# Patient Record
Sex: FEMALE | Race: WHITE | NOT HISPANIC OR LATINO | Employment: FULL TIME | URBAN - METROPOLITAN AREA
[De-identification: names, ages, dates, MRNs, and addresses within clinical notes are randomized per-mention and may not be internally consistent; named-entity substitution may affect disease eponyms.]

---

## 2024-02-27 ENCOUNTER — APPOINTMENT (OUTPATIENT)
Dept: LAB | Facility: CLINIC | Age: 44
End: 2024-02-27

## 2024-02-27 ENCOUNTER — APPOINTMENT (OUTPATIENT)
Dept: URGENT CARE | Facility: CLINIC | Age: 44
End: 2024-02-27

## 2024-02-27 DIAGNOSIS — Z02.1 PRE-EMPLOYMENT HEALTH SCREENING EXAMINATION: ICD-10-CM

## 2024-02-27 DIAGNOSIS — Z02.1 PRE-EMPLOYMENT HEALTH SCREENING EXAMINATION: Primary | ICD-10-CM

## 2024-02-27 LAB — RUBV IGG SERPL IA-ACNC: 14.1 IU/ML

## 2024-02-27 PROCEDURE — 86787 VARICELLA-ZOSTER ANTIBODY: CPT

## 2024-02-27 PROCEDURE — 36415 COLL VENOUS BLD VENIPUNCTURE: CPT

## 2024-02-27 PROCEDURE — 86765 RUBEOLA ANTIBODY: CPT

## 2024-02-27 PROCEDURE — 86735 MUMPS ANTIBODY: CPT

## 2024-02-27 PROCEDURE — 86480 TB TEST CELL IMMUN MEASURE: CPT

## 2024-02-27 PROCEDURE — 86762 RUBELLA ANTIBODY: CPT

## 2024-02-28 LAB
GAMMA INTERFERON BACKGROUND BLD IA-ACNC: 0.06 IU/ML
M TB IFN-G BLD-IMP: NEGATIVE
M TB IFN-G CD4+ BCKGRND COR BLD-ACNC: 0.03 IU/ML
M TB IFN-G CD4+ BCKGRND COR BLD-ACNC: 0.09 IU/ML
MEV IGG SER QL IA: NORMAL
MITOGEN IGNF BCKGRD COR BLD-ACNC: 9.94 IU/ML
MUV IGG SER QL IA: NORMAL
VZV IGG SER QL IA: NORMAL

## 2024-06-23 ENCOUNTER — HOSPITAL ENCOUNTER (OUTPATIENT)
Facility: HOSPITAL | Age: 44
Setting detail: OBSERVATION
Discharge: HOME | End: 2024-06-24
Attending: EMERGENCY MEDICINE | Admitting: INTERNAL MEDICINE
Payer: COMMERCIAL

## 2024-06-23 ENCOUNTER — APPOINTMENT (EMERGENCY)
Dept: RADIOLOGY | Facility: HOSPITAL | Age: 44
End: 2024-06-23
Payer: COMMERCIAL

## 2024-06-23 DIAGNOSIS — R94.31 ABNORMAL EKG: ICD-10-CM

## 2024-06-23 DIAGNOSIS — R00.2 PALPITATIONS: Primary | ICD-10-CM

## 2024-06-23 PROBLEM — G43.909 MIGRAINE, UNSPECIFIED, NOT INTRACTABLE, WITHOUT STATUS MIGRAINOSUS: Status: ACTIVE | Noted: 2024-06-23

## 2024-06-23 PROBLEM — I10 PRIMARY HYPERTENSION: Status: ACTIVE | Noted: 2024-06-23

## 2024-06-23 PROBLEM — E78.49 FAMILIAL HYPERLIPIDEMIA: Status: ACTIVE | Noted: 2024-06-23

## 2024-06-23 PROBLEM — F41.9 ANXIETY DISORDER, UNSPECIFIED: Status: ACTIVE | Noted: 2024-06-23

## 2024-06-23 PROBLEM — E66.01 MORBID OBESITY (HCC): Status: ACTIVE | Noted: 2024-06-23

## 2024-06-23 PROBLEM — I49.3 VENTRICULAR PREMATURE DEPOLARIZATION: Status: ACTIVE | Noted: 2024-06-23

## 2024-06-23 LAB
2HR DELTA HS TROPONIN: -1 NG/L
4HR DELTA HS TROPONIN: -7 NG/L
ALBUMIN SERPL BCG-MCNC: 4.2 G/DL (ref 3.5–5)
ALP SERPL-CCNC: 63 U/L (ref 34–104)
ALT SERPL W P-5'-P-CCNC: 15 U/L (ref 7–52)
ANION GAP SERPL CALCULATED.3IONS-SCNC: 10 MMOL/L (ref 4–13)
AST SERPL W P-5'-P-CCNC: 21 U/L (ref 13–39)
BASOPHILS # BLD AUTO: 0.09 THOUSANDS/ÂΜL (ref 0–0.1)
BASOPHILS NFR BLD AUTO: 1 % (ref 0–1)
BILIRUB SERPL-MCNC: 0.37 MG/DL (ref 0.2–1)
BUN SERPL-MCNC: 11 MG/DL (ref 5–25)
CALCIUM SERPL-MCNC: 9.4 MG/DL (ref 8.4–10.2)
CARDIAC TROPONIN I PNL SERPL HS: 17 NG/L
CARDIAC TROPONIN I PNL SERPL HS: 23 NG/L
CARDIAC TROPONIN I PNL SERPL HS: 24 NG/L
CHLORIDE SERPL-SCNC: 105 MMOL/L (ref 96–108)
CO2 SERPL-SCNC: 22 MMOL/L (ref 21–32)
CREAT SERPL-MCNC: 0.62 MG/DL (ref 0.6–1.3)
EOSINOPHIL # BLD AUTO: 0.08 THOUSAND/ÂΜL (ref 0–0.61)
EOSINOPHIL NFR BLD AUTO: 1 % (ref 0–6)
ERYTHROCYTE [DISTWIDTH] IN BLOOD BY AUTOMATED COUNT: 13.3 % (ref 11.6–15.1)
EXT PREGNANCY TEST URINE: NEGATIVE
EXT. CONTROL: NORMAL
GFR SERPL CREATININE-BSD FRML MDRD: 110 ML/MIN/1.73SQ M
GLUCOSE SERPL-MCNC: 103 MG/DL (ref 65–140)
HCT VFR BLD AUTO: 42.8 % (ref 34.8–46.1)
HGB BLD-MCNC: 13.8 G/DL (ref 11.5–15.4)
IMM GRANULOCYTES # BLD AUTO: 0.06 THOUSAND/UL (ref 0–0.2)
IMM GRANULOCYTES NFR BLD AUTO: 1 % (ref 0–2)
LYMPHOCYTES # BLD AUTO: 2.17 THOUSANDS/ÂΜL (ref 0.6–4.47)
LYMPHOCYTES NFR BLD AUTO: 16 % (ref 14–44)
MAGNESIUM SERPL-MCNC: 2.1 MG/DL (ref 1.9–2.7)
MCH RBC QN AUTO: 27.8 PG (ref 26.8–34.3)
MCHC RBC AUTO-ENTMCNC: 32.2 G/DL (ref 31.4–37.4)
MCV RBC AUTO: 86 FL (ref 82–98)
MONOCYTES # BLD AUTO: 0.49 THOUSAND/ÂΜL (ref 0.17–1.22)
MONOCYTES NFR BLD AUTO: 4 % (ref 4–12)
NEUTROPHILS # BLD AUTO: 10.38 THOUSANDS/ÂΜL (ref 1.85–7.62)
NEUTS SEG NFR BLD AUTO: 77 % (ref 43–75)
NRBC BLD AUTO-RTO: 0 /100 WBCS
PLATELET # BLD AUTO: 306 THOUSANDS/UL (ref 149–390)
PMV BLD AUTO: 10.2 FL (ref 8.9–12.7)
POTASSIUM SERPL-SCNC: 3.9 MMOL/L (ref 3.5–5.3)
PROT SERPL-MCNC: 7.7 G/DL (ref 6.4–8.4)
RBC # BLD AUTO: 4.97 MILLION/UL (ref 3.81–5.12)
SODIUM SERPL-SCNC: 137 MMOL/L (ref 135–147)
TSH SERPL DL<=0.05 MIU/L-ACNC: 2.02 UIU/ML (ref 0.45–4.5)
WBC # BLD AUTO: 13.27 THOUSAND/UL (ref 4.31–10.16)

## 2024-06-23 PROCEDURE — 81025 URINE PREGNANCY TEST: CPT | Performed by: EMERGENCY MEDICINE

## 2024-06-23 PROCEDURE — 71046 X-RAY EXAM CHEST 2 VIEWS: CPT

## 2024-06-23 PROCEDURE — 96360 HYDRATION IV INFUSION INIT: CPT

## 2024-06-23 PROCEDURE — 99223 1ST HOSP IP/OBS HIGH 75: CPT | Performed by: PHYSICIAN ASSISTANT

## 2024-06-23 PROCEDURE — 36415 COLL VENOUS BLD VENIPUNCTURE: CPT | Performed by: EMERGENCY MEDICINE

## 2024-06-23 PROCEDURE — 99285 EMERGENCY DEPT VISIT HI MDM: CPT | Performed by: EMERGENCY MEDICINE

## 2024-06-23 PROCEDURE — 99285 EMERGENCY DEPT VISIT HI MDM: CPT

## 2024-06-23 PROCEDURE — 80053 COMPREHEN METABOLIC PANEL: CPT | Performed by: EMERGENCY MEDICINE

## 2024-06-23 PROCEDURE — 84443 ASSAY THYROID STIM HORMONE: CPT | Performed by: PHYSICIAN ASSISTANT

## 2024-06-23 PROCEDURE — 85025 COMPLETE CBC W/AUTO DIFF WBC: CPT | Performed by: EMERGENCY MEDICINE

## 2024-06-23 PROCEDURE — 83735 ASSAY OF MAGNESIUM: CPT | Performed by: PHYSICIAN ASSISTANT

## 2024-06-23 PROCEDURE — 84484 ASSAY OF TROPONIN QUANT: CPT | Performed by: EMERGENCY MEDICINE

## 2024-06-23 PROCEDURE — 84484 ASSAY OF TROPONIN QUANT: CPT | Performed by: PHYSICIAN ASSISTANT

## 2024-06-23 PROCEDURE — 93005 ELECTROCARDIOGRAM TRACING: CPT

## 2024-06-23 RX ORDER — CETIRIZINE HYDROCHLORIDE 5 MG/1
10 TABLET ORAL DAILY
COMMUNITY

## 2024-06-23 RX ORDER — ASPIRIN 81 MG/1
81 TABLET, CHEWABLE ORAL DAILY
COMMUNITY

## 2024-06-23 RX ORDER — HYDROCHLOROTHIAZIDE 12.5 MG/1
12.5 TABLET ORAL DAILY
Status: DISCONTINUED | OUTPATIENT
Start: 2024-06-23 | End: 2024-06-24 | Stop reason: HOSPADM

## 2024-06-23 RX ORDER — ROSUVASTATIN CALCIUM 40 MG/1
40 TABLET, COATED ORAL
COMMUNITY

## 2024-06-23 RX ORDER — MECLIZINE HCL 12.5 MG/1
25 TABLET ORAL 3 TIMES DAILY PRN
Status: DISCONTINUED | OUTPATIENT
Start: 2024-06-23 | End: 2024-06-24 | Stop reason: HOSPADM

## 2024-06-23 RX ORDER — ALPRAZOLAM 0.5 MG/1
0.5 TABLET ORAL 2 TIMES DAILY PRN
Status: DISCONTINUED | OUTPATIENT
Start: 2024-06-23 | End: 2024-06-24 | Stop reason: HOSPADM

## 2024-06-23 RX ORDER — MECLIZINE HYDROCHLORIDE 25 MG/1
TABLET ORAL 3 TIMES DAILY PRN
COMMUNITY

## 2024-06-23 RX ORDER — ACETAMINOPHEN 325 MG/1
650 TABLET ORAL EVERY 6 HOURS PRN
Status: DISCONTINUED | OUTPATIENT
Start: 2024-06-23 | End: 2024-06-24 | Stop reason: HOSPADM

## 2024-06-23 RX ORDER — ALPRAZOLAM 0.5 MG/1
0.5 TABLET ORAL 2 TIMES DAILY PRN
COMMUNITY

## 2024-06-23 RX ORDER — HYDROCHLOROTHIAZIDE 12.5 MG/1
12.5 TABLET ORAL DAILY
COMMUNITY

## 2024-06-23 RX ORDER — ASPIRIN 81 MG/1
81 TABLET, CHEWABLE ORAL DAILY
Status: DISCONTINUED | OUTPATIENT
Start: 2024-06-23 | End: 2024-06-24 | Stop reason: HOSPADM

## 2024-06-23 RX ORDER — ZOLMITRIPTAN 2.5 MG/1
2.5 TABLET, FILM COATED ORAL EVERY 2 HOUR PRN
COMMUNITY

## 2024-06-23 RX ORDER — ATORVASTATIN CALCIUM 40 MG/1
80 TABLET, FILM COATED ORAL DAILY
Status: DISCONTINUED | OUTPATIENT
Start: 2024-06-23 | End: 2024-06-24 | Stop reason: HOSPADM

## 2024-06-23 RX ORDER — NAPROXEN 250 MG/1
250 TABLET ORAL 2 TIMES DAILY PRN
COMMUNITY

## 2024-06-23 RX ADMIN — HYDROCHLOROTHIAZIDE 12.5 MG: 12.5 TABLET ORAL at 21:07

## 2024-06-23 RX ADMIN — SODIUM CHLORIDE 1000 ML: 0.9 INJECTION, SOLUTION INTRAVENOUS at 15:42

## 2024-06-23 RX ADMIN — ASPIRIN 81 MG CHEWABLE TABLET 81 MG: 81 TABLET CHEWABLE at 21:07

## 2024-06-23 RX ADMIN — ALPRAZOLAM 0.5 MG: 0.5 TABLET ORAL at 21:07

## 2024-06-23 NOTE — LETTER
[facility] MED SURG UNIT  [address]  [place] [address]  Dept: [phone]    June 24, 2024     Patient: Francoise Sawyer   YOB: 1980   Date of Visit: 6/23/2024       To Whom it May Concern:    Francoise Sawyer is under my professional care. She was seen in the hospital from 6/23/2024 to 06/24/24. She may return to work on 6/26/2024 without limitations. Please allow patient flexibility for follow up appointments that are scheduled in the upcoming weeks.     If you have any questions or concerns, please don't hesitate to call.         Sincerely,          Sandra Sheikh PA-C

## 2024-06-23 NOTE — ED PROVIDER NOTES
"History  Chief Complaint   Patient presents with    Chest Pain     Pt to ED c/o chest heaviness that started about 2 hours ago. Pt states at that time heart was racing and was dizzy like she was going to pass out that lasted about 2 mins. Pt states her heart doesn't feel like its racing any more but doesn't feel \"right\"      43-year-old female presents for evaluation of sudden onset palpitations that started approximately 2 hours prior to arrival.  Patient reports she was wiping a table down in her kitchen when all of a sudden felt her heart racing and she became very lightheaded.  Symptoms lasted approximately 2 minutes then resolved spontaneously.  She reports currently still feeling mildly lightheaded.  History of vertigo but states this does not feel similar.        Prior to Admission Medications   Prescriptions Last Dose Informant Patient Reported? Taking?   ALPRAZolam (XANAX) 0.5 mg tablet   Yes Yes   Sig: Take 0.5 mg by mouth 2 (two) times a day as needed for anxiety   ZOLMitriptan (ZOMIG) 2.5 MG tablet   Yes Yes   Sig: Take 2.5 mg by mouth every 2 (two) hours as needed for migraine   aspirin 81 mg chewable tablet   Yes Yes   Sig: Chew 81 mg daily   hydroCHLOROthiazide 12.5 mg tablet   Yes Yes   Sig: Take 12.5 mg by mouth daily   meclizine (ANTIVERT) 25 mg tablet   Yes Yes   Sig: Take by mouth 3 (three) times a day as needed for dizziness   rosuvastatin (CRESTOR) 40 MG tablet   Yes Yes   Sig: Take 40 mg by mouth daily at bedtime      Facility-Administered Medications: None       Past Medical History:   Diagnosis Date    High cholesterol     Hypertension     Migraines     Vertigo        History reviewed. No pertinent surgical history.    History reviewed. No pertinent family history.  I have reviewed and agree with the history as documented.    E-Cigarette/Vaping     E-Cigarette/Vaping Substances     Social History     Tobacco Use    Smoking status: Never     Passive exposure: Never    Smokeless tobacco: Never "   Substance Use Topics    Alcohol use: Not Currently    Drug use: Never       Review of Systems   Cardiovascular:  Positive for palpitations.       Physical Exam  Physical Exam  Vitals and nursing note reviewed.   Constitutional:       Appearance: She is well-developed.   HENT:      Head: Normocephalic and atraumatic.      Right Ear: External ear normal.      Left Ear: External ear normal.      Nose: Nose normal.   Eyes:      General: No scleral icterus.  Cardiovascular:      Rate and Rhythm: Normal rate.   Pulmonary:      Effort: Pulmonary effort is normal. No respiratory distress.   Abdominal:      General: There is no distension.   Musculoskeletal:         General: No deformity. Normal range of motion.      Cervical back: Normal range of motion.   Skin:     Findings: No rash.   Neurological:      General: No focal deficit present.      Mental Status: She is alert and oriented to person, place, and time.   Psychiatric:         Mood and Affect: Mood normal.         Vital Signs  ED Triage Vitals [06/23/24 1505]   Temperature Pulse Respirations Blood Pressure SpO2   98.6 °F (37 °C) 95 20 (!) 179/135 99 %      Temp Source Heart Rate Source Patient Position - Orthostatic VS BP Location FiO2 (%)   Temporal Monitor Sitting Right arm --      Pain Score       4           Vitals:    06/23/24 1530 06/23/24 1630 06/23/24 1700 06/23/24 1739   BP: 140/85 112/62 147/80 (!) 157/102   Pulse: 84 72 98 80   Patient Position - Orthostatic VS: Lying Lying Lying          Visual Acuity      ED Medications  Medications   ALPRAZolam (XANAX) tablet 0.5 mg (has no administration in time range)   aspirin chewable tablet 81 mg (has no administration in time range)   hydroCHLOROthiazide tablet 12.5 mg (has no administration in time range)   atorvastatin (LIPITOR) tablet 80 mg (has no administration in time range)   meclizine (ANTIVERT) tablet 25 mg (has no administration in time range)   acetaminophen (TYLENOL) tablet 650 mg (has no  administration in time range)   sodium chloride 0.9 % bolus 1,000 mL (0 mL Intravenous Stopped 6/23/24 1652)       Diagnostic Studies  Results Reviewed       Procedure Component Value Units Date/Time    TSH, 3rd generation with Free T4 reflex [[id number]]  (Normal) Collected: 06/23/24 1538    Lab Status: Final result Specimen: Blood from Arm, Left Updated: 06/23/24 1753     TSH 3RD GENERATON 2.025 uIU/mL     Magnesium [[id number]]  (Normal) Collected: 06/23/24 1538    Lab Status: Final result Specimen: Blood from Arm, Left Updated: 06/23/24 1747     Magnesium 2.1 mg/dL     HS Troponin I 2hr [[id number]]     Lab Status: No result Specimen: Blood     HS Troponin I 4hr [[id number]]     Lab Status: No result Specimen: Blood     HS Troponin 0hr (reflex protocol) [[id number]]  (Normal) Collected: 06/23/24 1538    Lab Status: Final result Specimen: Blood from Arm, Left Updated: 06/23/24 1610     hs TnI 0hr 24 ng/L     Comprehensive metabolic panel [[id number]] Collected: 06/23/24 1538    Lab Status: Final result Specimen: Blood from Arm, Left Updated: 06/23/24 1610     Sodium 137 mmol/L      Potassium 3.9 mmol/L      Chloride 105 mmol/L      CO2 22 mmol/L      ANION GAP 10 mmol/L      BUN 11 mg/dL      Creatinine 0.62 mg/dL      Glucose 103 mg/dL      Calcium 9.4 mg/dL      AST 21 U/L      ALT 15 U/L      Alkaline Phosphatase 63 U/L      Total Protein 7.7 g/dL      Albumin 4.2 g/dL      Total Bilirubin 0.37 mg/dL      eGFR 110 ml/min/1.73sq m     Narrative:      National Kid[employer] Disease Foundation guidelines for Chronic Kidney Disease (CKD):     Stage 1 with normal or high GFR (GFR > 90 mL/min/1.73 square meters)    Stage 2 Mild CKD (GFR = 60-89 mL/min/1.73 square meters)    Stage 3A Moderate CKD (GFR = 45-59 mL/min/1.73 square meters)    Stage 3B Moderate CKD (GFR = 30-44 mL/min/1.73 square meters)    Stage 4 Severe CKD (GFR = 15-29 mL/min/1.73 square meters)    Stage 5 End Stage CKD (GFR <15 mL/min/1.73 square meters)  Note:  GFR calculation is accurate only with a steady state creatinine    POCT pregnancy, urine [[id number]]  (Normal) Resulted: 06/23/24 1547    Lab Status: Final result Updated: 06/23/24 1547     EXT Preg Test, Ur Negative     Control Valid    CBC and differential [[id number]]  (Abnormal) Collected: 06/23/24 1538    Lab Status: Final result Specimen: Blood from Arm, Left Updated: 06/23/24 1547     WBC 13.27 Thousand/uL      RBC 4.97 Million/uL      Hemoglobin 13.8 g/dL      Hematocrit 42.8 %      MCV 86 fL      MCH 27.8 pg      MCHC 32.2 g/dL      RDW 13.3 %      MPV 10.2 fL      Platelets 306 Thousands/uL      nRBC 0 /100 WBCs      Segmented % 77 %      Immature Grans % 1 %      Lymphocytes % 16 %      Monocytes % 4 %      Eosinophils Relative 1 %      Basophils Relative 1 %      Absolute Neutrophils 10.38 Thousands/µL      Absolute Immature Grans 0.06 Thousand/uL      Absolute Lymphocytes 2.17 Thousands/µL      Absolute Monocytes 0.49 Thousand/µL      Eosinophils Absolute 0.08 Thousand/µL      Basophils Absolute 0.09 Thousands/µL                    XR chest 2 views   Final Result by Ronda Eddy MD (06/23 1800)      No acute cardiopulmonary disease.               Workstation performed: [id number]                    Procedures  Procedures         ED Course                               SBIRT 20yo+      Flowsheet Row Most Recent Value   Initial Alcohol Screen: US AUDIT-C     1. How often do you have a drink containing alcohol? 0 Filed at: 06/23/2024 1507   2. How many drinks containing alcohol do you have on a typical day you are drinking?  0 Filed at: 06/23/2024 1507   3a. Male UNDER 65: How often do you have five or more drinks on one occasion? 0 Filed at: 06/23/2024 1507   3b. FEMALE Any Age, or MALE 65+: How often do you have 4 or more drinks on one occassion? 0 Filed at: 06/23/2024 1507   Audit-C Score 0 Filed at: 06/23/2024 1507   BEVERLEY: How many times in the past year have you...    Used an illegal drug or  used a prescription medication for non-medical reasons? Never Filed at: 06/23/2024 1507                      Medical Decision Making  43-year-old female presenting with palpitations and lightheadedness.  Obtain cardiopulmonary evaluations with labs, EKG.  EKG reveals T wave inversions.  Troponin slightly elevated.  Will admit for observation telemetry.    Amount and/or Complexity of Data Reviewed  Labs: ordered.    Risk  Decision regarding hospitalization.             Disposition  Final diagnoses:   Palpitations   Abnormal EKG     Time reflects when diagnosis was documented in both MDM as applicable and the Disposition within this note       Time User Action Codes Description Comment    6/23/2024  4:49 PM Sanya Landeros [R00.2] Palpitations     6/23/2024  4:49 PM Sanya Landeros [R94.31] Abnormal EKG           ED Disposition       ED Disposition   Admit    Condition   Stable    Date/Time   Sun Jun 23, 2024 8130    Comment   Case was discussed with JANKI and the patient's admission status was agreed to be Admission Status: observation status to the service of Dr. Perales .               Follow-up Information       Follow up With Specialties Details Why Contact Info    Jose Rosenberg MD Cardiology Schedule an appointment as soon as possible for a visit in 1 week(s)  [address] [place] [address]  [phone]              Current Discharge Medication List        CONTINUE these medications which have NOT CHANGED    Details   ALPRAZolam (XANAX) 0.5 mg tablet Take 0.5 mg by mouth 2 (two) times a day as needed for anxiety      aspirin 81 mg chewable tablet Chew 81 mg daily      hydroCHLOROthiazide 12.5 mg tablet Take 12.5 mg by mouth daily      meclizine (ANTIVERT) 25 mg tablet Take by mouth 3 (three) times a day as needed for dizziness      rosuvastatin (CRESTOR) 40 MG tablet Take 40 mg by mouth daily at bedtime      ZOLMitriptan (ZOMIG) 2.5 MG tablet Take 2.5 mg by mouth every 2 (two) hours as  needed for migraine             No discharge procedures on file.    PDMP Review       None            ED Provider  Electronically Signed by             Sanya Landeros DO  06/23/24 9697

## 2024-06-23 NOTE — H&P
[facility]  H&P  Name: Francoise Sawyer 43 y.o. female I MRN: [id number]  Unit/Bed#: -01 I Date of Admission: 6/23/2024   Date of Service: 6/23/2024 I Hospital Day: 0      Assessment & Plan   * Palpitations  Assessment & Plan  Felt approx 2 mins of palpitations, heart racing, lightheadedness at home, terminated prior to ER  Possible pSVT  Trop 24, trend   EKG non-ischemic  Check TSH, Mag  Tele overnight  Cardiology consult  Admit for obs  Likely needs ambulatory monitor after discharge    Familial hyperlipidemia  Assessment & Plan  Cont home statin    Primary hypertension  Assessment & Plan  BP 140s  Cont HCTZ  Monitor BP    Migraine, unspecified, not intractable, without status migrainosus  Assessment & Plan  Zomig PRN    Anxiety disorder, unspecified  Assessment & Plan  Cont home Xanax         VTE Pharmacologic Prophylaxis: VTE Score: 2 Low Risk (Score 0-2) - Encourage Ambulation.  Code Status: full code    Anticipated Length of Stay: Patient will be admitted on an observation basis with an anticipated length of stay of less than 2 midnights secondary to palpitations and likely cardiac arrhythmia requiring telemetry monitoring and cardiology consult.    Total Time for Visit, including Counseling / Coordination of Care: 75 Minutes. Greater than 50% of this total time spent on direct patient counseling and coordination of care.    Chief Complaint: palpitations    History of Present Illness:  Francoise Sawyer is a 43 y.o. female presenting after 2 minutes of palpitations, lightheadedness and feeling her heart race.  Patient reports she was in her normal state of health, suddenly felt onset of palpitations and lightheadedness.  Did not pass out but felt very ill.  Has had history of PVCs in the past with normal workup.  Is not on beta-blocker or rate altering medications.  Came into the ER for evaluation without symptoms at this time.  Feels anxious at this point but her  palpitations are gone.    Review of Systems:  Review of Systems   Constitutional:  Negative for activity change, appetite change, chills, fatigue and fever.   HENT:  Negative for congestion, rhinorrhea, sinus pressure and sore throat.    Eyes:  Negative for photophobia, pain and visual disturbance.   Respiratory:  Negative for cough, shortness of breath and wheezing.    Cardiovascular:  Positive for palpitations. Negative for chest pain and leg swelling.   Gastrointestinal:  Negative for abdominal distention, abdominal pain, constipation, diarrhea, nausea and vomiting.   Endocrine: Negative for cold intolerance, heat intolerance, polydipsia and polyuria.   Genitourinary:  Negative for difficulty urinating, dysuria, flank pain, frequency and hematuria.   Musculoskeletal:  Negative for arthralgias, back pain and joint swelling.   Skin:  Negative for color change, pallor and rash.   Allergic/Immunologic: Negative.    Neurological:  Positive for light-headedness. Negative for dizziness, syncope, weakness and headaches.   Hematological: Negative.    Psychiatric/Behavioral: Negative.         Past Medical and Surgical History:   Past Medical History:   Diagnosis Date    High cholesterol     Hypertension     Migraines     Vertigo        History reviewed. No pertinent surgical history.    Meds/Allergies:  Prior to Admission medications    Medication Sig Start Date End Date Taking? Authorizing Provider   ALPRAZolam (XANAX) 0.5 mg tablet Take 0.5 mg by mouth 2 (two) times a day as needed for anxiety   Yes Historical Provider, MD   aspirin 81 mg chewable tablet Chew 81 mg daily   Yes Historical Provider, MD   hydroCHLOROthiazide 12.5 mg tablet Take 12.5 mg by mouth daily   Yes Historical Provider, MD   meclizine (ANTIVERT) 25 mg tablet Take by mouth 3 (three) times a day as needed for dizziness   Yes Historical Provider, MD   rosuvastatin (CRESTOR) 40 MG tablet Take 40 mg by mouth daily at bedtime   Yes Historical Provider, MD    ZOLMitriptan (ZOMIG) 2.5 MG tablet Take 2.5 mg by mouth every 2 (two) hours as needed for migraine   Yes Historical Provider, MD       All medications reviewed.    Allergies: No Known Allergies    Social History:  Marital Status: /Civil Union     Substance Use History:   Social History     Substance and Sexual Activity   Alcohol Use Not Currently     Social History     Tobacco Use   Smoking Status Never    Passive exposure: Never   Smokeless Tobacco Never     Social History     Substance and Sexual Activity   Drug Use Never       Family History:  Non-contributory    Physical Exam:     Vitals:   Blood Pressure: (!) 157/102 (06/23/24 1739)  Pulse: 80 (06/23/24 1739)  Temperature: 98.3 °F (36.8 °C) (06/23/24 1739)  Temp Source: Temporal (06/23/24 1505)  Respirations: 18 (06/23/24 1700)  Weight - Scale: (!) 166 kg (365 lb) (06/23/24 1505)  SpO2: 96 % (06/23/24 1739)    Physical Exam  Vitals and nursing note reviewed.   Constitutional:       General: She is not in acute distress.     Appearance: Normal appearance.   HENT:      Head: Normocephalic and atraumatic.      Mouth/Throat:      Mouth: Mucous membranes are moist.   Eyes:      General: No scleral icterus.     Extraocular Movements: Extraocular movements intact.      Pupils: Pupils are equal, round, and reactive to light.   Cardiovascular:      Rate and Rhythm: Normal rate and regular rhythm.      Heart sounds: Normal heart sounds. No murmur heard.     No friction rub.   Pulmonary:      Effort: Pulmonary effort is normal.      Breath sounds: Normal breath sounds. No wheezing or rhonchi.   Abdominal:      General: Bowel sounds are normal. There is no distension.      Palpations: Abdomen is soft.      Tenderness: There is no abdominal tenderness. There is no guarding.   Musculoskeletal:         General: No swelling.      Cervical back: Neck supple.      Right lower leg: No edema.      Left lower leg: No edema.   Skin:     General: Skin is warm and dry.       Capillary Refill: Capillary refill takes less than 2 seconds.      Coloration: Skin is not jaundiced or pale.   Neurological:      General: No focal deficit present.      Mental Status: She is alert and oriented to person, place, and time. Mental status is at baseline.          Additional Data:     Lab Results:  Results from last 7 days   Lab Units 06/23/24  1538   WBC Thousand/uL 13.27*   HEMOGLOBIN g/dL 13.8   HEMATOCRIT % 42.8   PLATELETS Thousands/uL 306   SEGS PCT % 77*   LYMPHO PCT % 16   MONO PCT % 4   EOS PCT % 1     Results from last 7 days   Lab Units 06/23/24  1538   SODIUM mmol/L 137   POTASSIUM mmol/L 3.9   CHLORIDE mmol/L 105   CO2 mmol/L 22   BUN mg/dL 11   CREATININE mg/dL 0.62   ANION GAP mmol/L 10   CALCIUM mg/dL 9.4   ALBUMIN g/dL 4.2   TOTAL BILIRUBIN mg/dL 0.37   ALK PHOS U/L 63   ALT U/L 15   AST U/L 21   GLUCOSE RANDOM mg/dL 103                       Imaging: All pertinent imaging reviewed.  XR chest 2 views    (Results Pending)       EKG and Other Studies Reviewed on Admission:   Prior pertinent studies and records reviewed in [facility]/Care Everywhere.    ** Please Note: This note has been constructed using a voice recognition system. **

## 2024-06-23 NOTE — ASSESSMENT & PLAN NOTE
Felt approx 2 mins of palpitations, heart racing, lightheadedness at home, terminated prior to ER  Possible pSVT  Trop 24, trend   EKG non-ischemic  Check TSH, Mag  Tele overnight  Cardiology consult  Admit for obs  Likely needs ambulatory monitor after discharge

## 2024-06-24 ENCOUNTER — APPOINTMENT (OUTPATIENT)
Dept: NON INVASIVE DIAGNOSTICS | Facility: HOSPITAL | Age: 44
End: 2024-06-24
Payer: COMMERCIAL

## 2024-06-24 VITALS
DIASTOLIC BLOOD PRESSURE: 92 MMHG | TEMPERATURE: 98 F | HEART RATE: 79 BPM | HEIGHT: 67 IN | BODY MASS INDEX: 45.99 KG/M2 | OXYGEN SATURATION: 96 % | RESPIRATION RATE: 18 BRPM | SYSTOLIC BLOOD PRESSURE: 136 MMHG | WEIGHT: 293 LBS

## 2024-06-24 LAB
AORTIC ROOT: 3.4 CM
APICAL FOUR CHAMBER EJECTION FRACTION: 69 %
ASCENDING AORTA: 3.1 CM
BSA FOR ECHO PROCEDURE: 2.6 M2
E WAVE DECELERATION TIME: 165 MS
E/A RATIO: 0.95
FRACTIONAL SHORTENING: 30 (ref 28–44)
INTERVENTRICULAR SEPTUM IN DIASTOLE (PARASTERNAL SHORT AXIS VIEW): 1.3 CM
INTERVENTRICULAR SEPTUM: 1.3 CM (ref 0.6–1.1)
LAAS-AP2: 19.7 CM2
LAAS-AP4: 29.8 CM2
LEFT ATRIUM SIZE: 4.8 CM
LEFT ATRIUM VOLUME (MOD BIPLANE): 90 ML
LEFT INTERNAL DIMENSION IN SYSTOLE: 3.3 CM (ref 2.1–4)
LEFT VENTRICLE DIASTOLIC VOLUME (MOD BIPLANE): 221 ML
LEFT VENTRICLE DIASTOLIC VOLUME INDEX (MOD BIPLANE): 85 ML/M2
LEFT VENTRICLE SYSTOLIC VOLUME (MOD BIPLANE): 71 ML
LEFT VENTRICLE SYSTOLIC VOLUME INDEX (MOD BIPLANE): 27.3 ML/M2
LEFT VENTRICULAR INTERNAL DIMENSION IN DIASTOLE: 4.7 CM (ref 3.5–6)
LEFT VENTRICULAR POSTERIOR WALL IN END DIASTOLE: 1.3 CM
LEFT VENTRICULAR STROKE VOLUME: 58 ML
LV EF: 68 %
LVSV (TEICH): 58 ML
MV E'TISSUE VEL-LAT: 15 CM/S
MV E'TISSUE VEL-SEP: 14 CM/S
MV PEAK A VEL: 0.6 M/S
MV PEAK E VEL: 57 CM/S
MV STENOSIS PRESSURE HALF TIME: 48 MS
MV VALVE AREA P 1/2 METHOD: 4.58
RA PRESSURE ESTIMATED: 3 MMHG
RIGHT ATRIUM AREA SYSTOLE A4C: 27.1 CM2
RIGHT VENTRICLE ID DIMENSION: 3.5 CM
SL CV LEFT ATRIUM LENGTH A2C: 5.1 CM
SL CV LV EF: 55
SL CV PED ECHO LEFT VENTRICLE DIASTOLIC VOLUME (MOD BIPLANE) 2D: 101 ML
SL CV PED ECHO LEFT VENTRICLE SYSTOLIC VOLUME (MOD BIPLANE) 2D: 43 ML
TRICUSPID ANNULAR PLANE SYSTOLIC EXCURSION: 1.9 CM

## 2024-06-24 PROCEDURE — 93306 TTE W/DOPPLER COMPLETE: CPT

## 2024-06-24 PROCEDURE — 93306 TTE W/DOPPLER COMPLETE: CPT | Performed by: INTERNAL MEDICINE

## 2024-06-24 PROCEDURE — 99239 HOSP IP/OBS DSCHRG MGMT >30: CPT

## 2024-06-24 PROCEDURE — 99244 OFF/OP CNSLTJ NEW/EST MOD 40: CPT | Performed by: INTERNAL MEDICINE

## 2024-06-24 NOTE — DISCHARGE SUMMARY
[facility]  Discharge- Francoise Sawyer 1980, 43 y.o. female MRN: [id number]  Unit/Bed#: -[facility] Encounter: [id number]  Primary Care Provider: Dorina Colin MD   Date and time admitted to hospital: 6/23/2024  3:09 PM    * Palpitations  Assessment & Plan  Felt approx 2 mins of palpitations, heart racing, lightheadedness at home, terminated prior to ER  Possible pSVT  Trop 24 > 17  EKG non-ischemic  TSH WNL  Tele overnight  Cardiology consulted  No further events overnight   Echo unremarkable  Outpatient ZIO patch on DC  Outpatient f/u with cardiology in 1 week   Lopressor 25 mg PRN palpitations on discharge  Cleared from cardiology stand point     Familial hyperlipidemia  Assessment & Plan  Cont home statin    Primary hypertension  Assessment & Plan  BP 140s  Cont HCTZ  Monitor BP    Migraine, unspecified, not intractable, without status migrainosus  Assessment & Plan  Zomig PRN    Anxiety disorder, unspecified  Assessment & Plan  Cont home Xanax        Medical Problems       Resolved Problems  Date Reviewed: 6/24/2024   None       Discharging Physician / Practitioner: Sandra Sheikh PA-C  PCP: Dorina Colin MD  Admission Date:   Admission Orders (From admission, onward)       Ordered        06/23/24 1650  Place in Observation  Once                          Discharge Date: 06/24/24    Consultations During Hospital Stay:  Cardiology     Procedures Performed:   None     Significant Findings / Test Results:   XR chest 2 views   Final Result by Ronda Eddy MD (06/23 1800)      No acute cardiopulmonary disease.               Workstation performed: [id number]           TTE 6/24:     Incidental Findings:   None      Test Results Pending at Discharge (will require follow up):   None      Outpatient Tests Requested:  Outpatient cardiac holter monitoring     Complications:  None     Reason for Admission: rapid HR    Hospital Course:   Francoise Sawyer is a 43  "y.o. female patient with PMH of migraines, anxiety, hypertension who originally presented to the hospital on 6/23/2024 due to acute onset 2-minute run of palpitations with associated lightheadedness and shortness of breath while patient was at home at rest.  Patient had prior history of PVCs in the past with normal workup, however notes this was different from her prior symptoms as she felt her heart was racing for 2 minutes.  Electrolytes were stable, ECG nonischemic, tropes WNL.  Cardiology was consulted.  Patient was monitored on telemetry and underwent echocardiogram which were unremarkable.  Patient was without recurrence of symptoms during her admission.  Cardiology recommended Zio patch on discharge for cardiac monitoring as well as follow-up in the cardiology office within 1 week of discharge.  Patient was cleared from cardiology standpoint without further need for inpatient workup.  Labs and vital stable, patient without further symptoms, cleared for discharge to home.    Please see above list of diagnoses and related plan for additional information.     Condition at Discharge: good    Discharge Day Visit / Exam:   Subjective: Patient denies any chest pain, palpitations, shortness of breath, abdominal complaints, or any further symptoms since admission.  Looking forward to going home.  Vitals: Blood Pressure: 136/92 (06/24/24 1405)  Pulse: 79 (06/24/24 1405)  Temperature: 98 °F (36.7 °C) (06/24/24 1257)  Temp Source: Oral (06/24/24 1257)  Respirations: 18 (06/24/24 1257)  Height: 5' 6.75\" (169.5 cm) (06/24/24 1405)  Weight - Scale: (!) 166 kg (365 lb) (06/24/24 1405)  SpO2: 96 % (06/24/24 1305)  Exam:   Physical Exam  Vitals and nursing note reviewed.   Constitutional:       General: She is not in acute distress.     Appearance: She is well-developed. She is not ill-appearing.   HENT:      Head: Normocephalic and atraumatic.   Eyes:      General:         Right eye: No discharge.         Left eye: No " discharge.      Extraocular Movements: Extraocular movements intact.      Conjunctiva/sclera: Conjunctivae normal.   Cardiovascular:      Rate and Rhythm: Normal rate and regular rhythm.      Heart sounds: No murmur heard.  Pulmonary:      Effort: Pulmonary effort is normal. No respiratory distress.      Breath sounds: Normal breath sounds. No wheezing, rhonchi or rales.   Abdominal:      General: Bowel sounds are normal. There is no distension.      Palpations: Abdomen is soft.      Tenderness: There is no abdominal tenderness.   Musculoskeletal:      Cervical back: Neck supple.      Right lower leg: No edema.      Left lower leg: No edema.   Skin:     General: Skin is warm and dry.      Capillary Refill: Capillary refill takes less than 2 seconds.   Neurological:      General: No focal deficit present.      Mental Status: She is alert and oriented to person, place, and time. Mental status is at baseline.      Cranial Nerves: No cranial nerve deficit.   Psychiatric:         Mood and Affect: Mood normal.         Behavior: Behavior normal.          Discussion with Family: Updated  () at bedside.    Discharge instructions/Information to patient and family:   See after visit summary for information provided to patient and family.      Provisions for Follow-Up Care:  See after visit summary for information related to follow-up care and any pertinent home health orders.      Mobility at time of Discharge:   Basic Mobility Inpatient Raw Score: 24  JH-HLM Goal: 8: Walk 250 feet or more  JH-HLM Achieved: 7: Walk 25 feet or more  HLM Goal achieved. Continue to encourage appropriate mobility.     Disposition:   Home    Planned Readmission: none      Discharge Statement:  I spent 45 minutes discharging the patient. This time was spent on the day of discharge. I had direct contact with the patient on the day of discharge. Greater than 50% of the total time was spent examining patient, answering all patient  questions, arranging and discussing plan of care with patient as well as directly providing post-discharge instructions.  Additional time then spent on discharge activities.    Discharge Medications:  See after visit summary for reconciled discharge medications provided to patient and/or family.      **Please Note: This note may have been constructed using a voice recognition system**

## 2024-06-24 NOTE — ASSESSMENT & PLAN NOTE
Felt approx 2 mins of palpitations, heart racing, lightheadedness at home, terminated prior to ER  Possible pSVT  Trop 24 > 17  EKG non-ischemic  TSH WNL  Tele overnight  Cardiology consulted  No further events overnight   Echo unremarkable  Outpatient ZIO patch on DC  Outpatient f/u with cardiology in 1 week   Lopressor 25 mg PRN palpitations on discharge  Cleared from cardiology stand point

## 2024-06-24 NOTE — CONSULTS
"Consult - Cardiology   Francoise Sawyer 43 y.o. female MRN: [id number]  Unit/Bed#: -01 Encounter: [id number]    Reason For Consult:  Palpitations   Outpatient Cardiologist: None            History of Present Illness:  Francoise Sawyer is a 43 y.o. year old female with PMH palpitations, familial hyperlipidemia, primary HTN, migraine, anxiety disorder, and seasonal allergies who presents to [facility] on 6/23/24 with a chief complaint of palpitations, lightheadedness that began yesterday 6/23/24 morning at home. She was seated at her kitchen table when she began to have palpitations with a \"pounding heartbeat\" and near syncope that lasted approx 2 mins. She denied any other episodes with near syncope in the past. Prior to yesterday, her palpitations/racing heartbeat would last hours to days, but do occur intermittently. She denied any alleviating factors. She mentioned that her palpitations correlate with the first days of her menses most months. She admitted to taking Zyrtec-D on Friday and Saturday prior to her episode Sunday. She denied any other medication changes, dehydration, activity changes recently. She admits to drinking 40-60oz water daily with moderate caffeine intake (2-3 cups coffee daily). Pt did have workup approx 7 years ago for her palpitations without any clear findings. Pt was told that palpitations were \"not concerning\" at that time. She has significant cardiac Fhx - father with fatal MI at 6 and both sets of grandparents with hypercholesterolemia. She currently takes Hctz 12.5mg daily for HTN, Rosuvastatin 40mg daily for HLD, and ASA 81mg for MI/CVA prevention.     ROS:  14 point ROS negative except as outlined above  Remainder review of systems is negative    Diagnostics:   Telemetry with NSR, regular rate. No PVCs seen during hospitalization.   Electrolytes have been stable during hospitalization.   CBC, CMP, troponin levels WNL.     PE:  General: Alert, oriented and " in no acute distress, cooperative  Head: Normocephalic, atraumatic.  Eyes: PERRLA. No icterus. Normal Conjunctiva.   Oropharynx: Moist and normal-appearing mucosa  Neck: Supple, symmetrical, trachea midline, JVD not appreciated.   Heart: RRR, no murmur, rub or gallop, S1 & S2 normal, no JVD noted  Lungs: Normal air entry, lungs clear to auscultation and no rales, rhonchi or wheezing   Abdomen: Flat, normal findings: bowel sounds normal and soft, non-tender  Lower Limbs:  No pitting edema, 2+ peripheral pulses, capillary refill within normal limits  Musculoskeletal: ROM grossly normal     Assessment and Plan:  Palpitations  TTE scheduled for today. Will contact patient with any abnormal results. Can otherwise be discharged from a CV standpoint. She has another  Appt today and would like to be discharged ASAP if possible. 1 week Holter monitor set up outpatient. Pt is agreeable to this plan.   Start Lopressor 25mg 1 tab PO daily PRN for palpitation symptoms/rapid HR.  Pt was educated to reduce any caffeine if possible. Avoid energy drinks, chocolate, coffee etc if having palpitations or she notices her symptom onset is related to intake.   Follow-up with cardio OP  HTN  Initial BP: 179/135 mmHg, Current /101 mmHg.  No further workup needed at this time.   Continue with Hctz 12.5mg 1 tab PO daily.  Follow up with PCP for HTN management.   HLD   No further workup needed at this time.   Continue Rosuvastatin 40mg 1 tab PO daily for familial hyperlipidemia.   Follow up with PCP for HLD management.       Past Medical History:        Past Medical History:   Diagnosis Date    High cholesterol     Hypertension     Migraines     PVC's (premature ventricular contractions)     Vertigo       Past Surgical History:   Procedure Laterality Date    MANDIBLE SURGERY Bilateral         Allergy:        No Known Allergies    Medications:       Prior to Admission medications    Medication Sig Start Date End Date Taking?  Authorizing Provider   ALPRAZolam (XANAX) 0.5 mg tablet Take 0.5 mg by mouth 2 (two) times a day as needed for anxiety   Yes Historical Provider, MD   aspirin 81 mg chewable tablet Chew 81 mg daily   Yes Historical Provider, MD   cetirizine (ZyrTEC) 5 MG tablet Take 5 mg by mouth daily   Yes Historical Provider, MD   glucosamine-chondroitin 500-400 MG tablet Take 1 tablet by mouth daily after breakfast   Yes Historical Provider, MD   hydroCHLOROthiazide 12.5 mg tablet Take 12.5 mg by mouth daily   Yes Historical Provider, MD   meclizine (ANTIVERT) 25 mg tablet Take by mouth 3 (three) times a day as needed for dizziness   Yes Historical Provider, MD   naproxen (NAPROSYN) 250 mg tablet Take 250 mg by mouth 2 (two) times a day as needed for mild pain   Yes Historical Provider, MD   rosuvastatin (CRESTOR) 40 MG tablet Take 40 mg by mouth daily at bedtime   Yes Historical Provider, MD   ZOLMitriptan (ZOMIG) 2.5 MG tablet Take 2.5 mg by mouth every 2 (two) hours as needed for migraine   Yes Historical Provider, MD       Family History:     Family History   Problem Relation Age of Onset    Stroke Mother     Hyperlipidemia Mother     Hypertension Father     Hyperlipidemia Father     Heart disease Father     Cancer Maternal Grandmother     Hyperlipidemia Maternal Grandmother     Hyperlipidemia Paternal Grandmother     Cancer Paternal Grandmother         Social History:       Social History     Socioeconomic History    Marital status: /Civil Union     Spouse name: None    Number of children: None    Years of education: None    Highest education level: None   Occupational History    None   Tobacco Use    Smoking status: Never     Passive exposure: Never    Smokeless tobacco: Never   Substance and Sexual Activity    Alcohol use: Not Currently    Drug use: Not Currently    Sexual activity: Yes     Partners: Male     Birth control/protection: Rhythm   Other Topics Concern    None   Social History Narrative    None      Social Determinants of Health     Financial Resource Strain: Not on file   Food Insecurity: Not on file   Transportation Needs: Not on file   Physical Activity: Not on file   Stress: Not on file   Social Connections: Not on file   Intimate Partner Violence: Not on file   Housing Stability: Not on file     Weights/BMI:    Wt Readings from Last 3 Encounters:   06/23/24 (!) 166 kg (365 lb)   , There is no height or weight on file to calculate BMI.

## 2024-06-24 NOTE — DISCHARGE INSTR - AVS FIRST PAGE
Follow-up providers:  Please follow-up with PCP within 1 week of discharge  Please follow-up with outpatient cardiology within 1 week of discharge    Medications:  Please take Lopressor 25 mg once by mouth as needed for palpitations/rapid heart rate    Outpatient testing  Please call cardiology office in order to set up outpatient Holter monitor    Please return to the emergency room if you experience new fevers or chills, chest pain, difficulty breathing, palpitations or rapid heart rate, dizziness or lightheadedness, syncope, or any additional concerning symptoms.

## 2024-06-27 LAB
ATRIAL RATE: 92 BPM
P AXIS: 27 DEGREES
PR INTERVAL: 164 MS
QRS AXIS: 21 DEGREES
QRSD INTERVAL: 82 MS
QT INTERVAL: 344 MS
QTC INTERVAL: 425 MS
T WAVE AXIS: 5 DEGREES
VENTRICULAR RATE: 92 BPM

## 2024-06-27 PROCEDURE — 93010 ELECTROCARDIOGRAM REPORT: CPT | Performed by: INTERNAL MEDICINE

## 2024-07-01 ENCOUNTER — OFFICE VISIT (OUTPATIENT)
Dept: CARDIOLOGY CLINIC | Facility: CLINIC | Age: 44
End: 2024-07-01
Payer: COMMERCIAL

## 2024-07-01 VITALS
DIASTOLIC BLOOD PRESSURE: 62 MMHG | HEART RATE: 76 BPM | HEIGHT: 66 IN | BODY MASS INDEX: 47.09 KG/M2 | SYSTOLIC BLOOD PRESSURE: 118 MMHG | WEIGHT: 293 LBS

## 2024-07-01 DIAGNOSIS — R00.2 PALPITATIONS: ICD-10-CM

## 2024-07-01 PROCEDURE — 99214 OFFICE O/P EST MOD 30 MIN: CPT | Performed by: PHYSICIAN ASSISTANT

## 2024-07-01 NOTE — PROGRESS NOTES
Cardiology Office Follow Up  Francoise Sawyer  1980  [id number]      ASSESSMENT:  Palpitations --suspect SVT vs PVCs  Hypertension  Hyperlipidemia  BMI 58.75  Hx of migraines  Anxiety disorder    PLAN:  Lily has been asymptomatic since her admission. No episodes of palpitations. Will proceed with 1 week ZIO event monitor to assess for any underlying arrhythmias or ectopy.  She reports concern with possible underlying CAD given mild troponin elevation and family history. She fortunately denies any overt chest pain or discomfort. Will order coronary CTA for definitive assessment of her coronary arteries to be scheduled at her convenience.  BP stable today 118/62; continue HCTZ 12.5 mg daily.  No LDL on file; plan for FLP following next visit -- continue rosuvastatin 40 mg daily.  No myalgias.  RTO in 6 to 8 weeks or sooner if needed.    Interval History/ HPI:   Francoise Sawyer is a 43-year-old female with history of palpitations due to PVCs, familial hyperlipidemia, hypertension, migraine, anxiety disorder, seasonal allergies who presents for hospital follow-up visit.  She presented to [facility] on 6/23/2024 with palpitations, lightheadedness that began the morning of admission. She was seated at her kitchen table at rest began to have palpitations with pounding and lightheadedness/ presyncope that lasted about 2 minutes. Prior to this complained of intermittent palpitations that lasted anywhere from 5 to 10 seconds notices symptoms more so around her menses. She otherwise denied any symptom patterns, exasperating or alleviating factors.    She underwent initial lab work including CBC, CMP, TFTs which were within normal limits. Serial troponins were 24/23/17.  EKG shows sinus rhythm without ischemic changes.  Telemetry unremarkable without acute events or sustained arrhythmias during her admission.  TTE showed EF 55%, normal systolic function and wall motion, normal diastolic function,  RV size normal with normal RV systolic function.  No significant valvular abnormalities noted.  She was asymptomatic at the time of her examination.  She was ordered for p.o. Lopressor 25 mg to take as needed based on symptoms and recommended for cardiology follow-up to be set up for 1 week Zio monitor.  She does take occasional decongestants such as Zyrtec D for seasonal allergies.  But not since her most recent discharge.  Reports of adequate hydration and drinking approximately 40 to 60 ounces of water daily. She drinks about 2 to 3 cups of coffee daily for several years.  Present father with fatal MI at 60 and both sets of grandparents with hypercholesterolemia.  Her blood pressures have been reasonably stable on HCTZ 12.5 mg daily.  Takes Crestor 40 mg daily for hyperlipidemia.      Vitals:  118/62  76  364 LBS    Past Medical History:   Diagnosis Date    High cholesterol     Hypertension     Migraines     PVC's (premature ventricular contractions)     Vertigo      Social History     Socioeconomic History    Marital status: /Civil Union     Spouse name: Not on file    Number of children: Not on file    Years of education: Not on file    Highest education level: Not on file   Occupational History    Not on file   Tobacco Use    Smoking status: Never     Passive exposure: Never    Smokeless tobacco: Never   Substance and Sexual Activity    Alcohol use: Not Currently    Drug use: Not Currently    Sexual activity: Yes     Partners: Male     Birth control/protection: Rhythm   Other Topics Concern    Not on file   Social History Narrative    Not on file     Social Determinants of Health     Financial Resource Strain: Not on file   Food Insecurity: Not on file   Transportation Needs: Not on file   Physical Activity: Not on file   Stress: Not on file   Social Connections: Not on file   Intimate Partner Violence: Not on file   Housing Stability: Not on file      Family History   Problem Relation Age of Onset     Stroke Mother     Hyperlipidemia Mother     Hypertension Father     Hyperlipidemia Father     Heart disease Father     Cancer Maternal Grandmother     Hyperlipidemia Maternal Grandmother     Hyperlipidemia Paternal Grandmother     Cancer Paternal Grandmother      Past Surgical History:   Procedure Laterality Date    MANDIBLE SURGERY Bilateral        Current Outpatient Medications:     ALPRAZolam (XANAX) 0.5 mg tablet, Take 0.5 mg by mouth 2 (two) times a day as needed for anxiety, Disp: , Rfl:     aspirin 81 mg chewable tablet, Chew 81 mg daily, Disp: , Rfl:     cetirizine (ZyrTEC) 5 MG tablet, Take 10 mg by mouth daily, Disp: , Rfl:     glucosamine-chondroitin 500-400 MG tablet, Take 1 tablet by mouth daily after breakfast, Disp: , Rfl:     hydroCHLOROthiazide 12.5 mg tablet, Take 12.5 mg by mouth daily, Disp: , Rfl:     meclizine (ANTIVERT) 25 mg tablet, Take by mouth 3 (three) times a day as needed for dizziness, Disp: , Rfl:     metoprolol tartrate (LOPRESSOR) 25 mg tablet, Take 1 tablet (25 mg total) by mouth daily as needed (palpitations), Disp: 30 tablet, Rfl: 0    naproxen (NAPROSYN) 250 mg tablet, Take 250 mg by mouth 2 (two) times a day as needed for mild pain, Disp: , Rfl:     rosuvastatin (CRESTOR) 40 MG tablet, Take 40 mg by mouth daily at bedtime, Disp: , Rfl:     ZOLMitriptan (ZOMIG) 2.5 MG tablet, Take 2.5 mg by mouth every 2 (two) hours as needed for migraine, Disp: , Rfl:       Review of Systems:  Review of Systems   Constitutional:  Negative for appetite change, chills, diaphoresis, fatigue and fever.   Respiratory:  Negative for cough, chest tightness and shortness of breath.    Cardiovascular:  Negative for chest pain, palpitations and leg swelling.   Gastrointestinal:  Negative for diarrhea, nausea and vomiting.   Endocrine: Negative for cold intolerance and heat intolerance.   Genitourinary:  Negative for difficulty urinating, dysuria and enuresis.   Musculoskeletal:  Negative for arthralgias,  back pain and gait problem.   Allergic/Immunologic: Negative for environmental allergies and food allergies.   Neurological:  Negative for dizziness, facial asymmetry and headaches.   Hematological:  Negative for adenopathy. Does not bruise/bleed easily.   Psychiatric/Behavioral:  Negative for agitation, behavioral problems and confusion.          Physical Exam:  Physical Exam  Constitutional:       Appearance: She is well-developed.   HENT:      Right Ear: External ear normal.      Left Ear: External ear normal.   Eyes:      Extraocular Movements: EOM normal.      Pupils: Pupils are equal, round, and reactive to light.   Cardiovascular:      Rate and Rhythm: Normal rate and regular rhythm.      Heart sounds: Normal heart sounds. No murmur heard.     No friction rub. No gallop.   Pulmonary:      Effort: Pulmonary effort is normal.      Breath sounds: Normal breath sounds.   Abdominal:      Palpations: Abdomen is soft.   Musculoskeletal:         General: Normal range of motion.      Cervical back: Normal range of motion.   Skin:     General: Skin is warm and dry.   Neurological:      Mental Status: She is alert and oriented to person, place, and time.      Deep Tendon Reflexes: Reflexes are normal and symmetric.   Psychiatric:         Mood and Affect: Mood and affect normal.         Behavior: Behavior normal.         Thought Content: Thought content normal.         Judgment: Judgment normal.         This note was completed in part utilizing [employer] Software.  Grammatical errors, random word insertions, spelling mistakes, and incomplete sentences can be an occasional consequence of this system secondary to software limitations, ambient noise, and hardware issues.  If you have any questions or concerns about the content, text, or information contained within the body of this dictation, please contact the provider for clarification.

## 2024-07-12 ENCOUNTER — TELEPHONE (OUTPATIENT)
Age: 44
End: 2024-07-12

## 2024-07-12 NOTE — TELEPHONE ENCOUNTER
Received call from pt, states she was instructed to call when she was going to apply Zio monitor.  Pt will apply it today and wear for 1 week, then mail back.

## 2024-07-25 ENCOUNTER — CLINICAL SUPPORT (OUTPATIENT)
Dept: CARDIOLOGY CLINIC | Facility: CLINIC | Age: 44
End: 2024-07-25
Payer: COMMERCIAL

## 2024-07-25 DIAGNOSIS — R00.2 PALPITATIONS: ICD-10-CM

## 2024-07-25 PROCEDURE — 93244 EXT ECG>48HR<7D REV&INTERPJ: CPT | Performed by: INTERNAL MEDICINE

## 2024-08-01 ENCOUNTER — HOSPITAL ENCOUNTER (OUTPATIENT)
Dept: CT IMAGING | Facility: HOSPITAL | Age: 44
Discharge: HOME | End: 2024-08-01
Payer: COMMERCIAL

## 2024-08-01 VITALS — SYSTOLIC BLOOD PRESSURE: 172 MMHG | HEART RATE: 75 BPM | DIASTOLIC BLOOD PRESSURE: 91 MMHG

## 2024-08-01 DIAGNOSIS — R00.2 PALPITATIONS: ICD-10-CM

## 2024-08-01 PROCEDURE — 75574 CT ANGIO HRT W/3D IMAGE: CPT

## 2024-08-01 RX ORDER — NITROGLYCERIN 0.4 MG/1
0.4 TABLET SUBLINGUAL
Status: DISCONTINUED | OUTPATIENT
Start: 2024-08-01 | End: 2024-08-02 | Stop reason: HOSPADM

## 2024-08-01 RX ORDER — METOPROLOL TARTRATE 1 MG/ML
5 INJECTION, SOLUTION INTRAVENOUS
Status: DISCONTINUED | OUTPATIENT
Start: 2024-08-01 | End: 2024-08-02 | Stop reason: HOSPADM

## 2024-08-01 RX ADMIN — METOPROLOL TARTRATE 5 MG: 5 INJECTION INTRAVENOUS at 15:04

## 2024-08-01 RX ADMIN — IOHEXOL 70 ML: 350 INJECTION, SOLUTION INTRAVENOUS at 15:43

## 2024-08-01 RX ADMIN — NITROGLYCERIN 0.4 MG: 0.4 TABLET SUBLINGUAL at 15:12

## 2024-08-01 NOTE — NURSING NOTE
Patient arrived for coronary CT angiography. Test education reviewed with patient. Medications and allergies verified. Patient took lopressor as instructed by cardiology. Patient given iv metoprolol to reach target HR. SL nitro given per protocol. See vitals flowsheet. Tolerated test well. Instructed to increase fluid intake remainder of day. Vitals stable, patient asymptomatic upon departure with spouse.

## 2024-08-10 ENCOUNTER — APPOINTMENT (OUTPATIENT)
Dept: LAB | Facility: HOSPITAL | Age: 44
End: 2024-08-10
Payer: COMMERCIAL

## 2024-08-10 DIAGNOSIS — G47.09 INITIAL INSOMNIA: ICD-10-CM

## 2024-08-10 DIAGNOSIS — E78.2 MIXED HYPERLIPIDEMIA: ICD-10-CM

## 2024-08-10 DIAGNOSIS — R53.83 OTHER FATIGUE: ICD-10-CM

## 2024-08-10 DIAGNOSIS — E88.810 DYSMETABOLIC SYNDROME X: ICD-10-CM

## 2024-08-10 DIAGNOSIS — E28.2 POLYCYSTIC OVARIES: ICD-10-CM

## 2024-08-10 DIAGNOSIS — R73.01 IMPAIRED FASTING GLUCOSE: ICD-10-CM

## 2024-08-10 DIAGNOSIS — R20.0 TACTILE ANESTHESIA: ICD-10-CM

## 2024-08-10 DIAGNOSIS — Z87.442 PERSONAL HISTORY OF URINARY CALCULI: ICD-10-CM

## 2024-08-10 DIAGNOSIS — Z78.9 AMERICAN DIABETES ASSOCIATION (ADA) 1100 CALORIE DIET: ICD-10-CM

## 2024-08-10 DIAGNOSIS — Z12.31 ENCOUNTER FOR SCREENING MAMMOGRAM FOR MALIGNANT NEOPLASM OF BREAST: ICD-10-CM

## 2024-08-10 LAB
ALBUMIN SERPL BCG-MCNC: 4 G/DL (ref 3.5–5)
ALP SERPL-CCNC: 62 U/L (ref 34–104)
ALT SERPL W P-5'-P-CCNC: 11 U/L (ref 7–52)
ANION GAP SERPL CALCULATED.3IONS-SCNC: 8 MMOL/L (ref 4–13)
AST SERPL W P-5'-P-CCNC: 11 U/L (ref 13–39)
BILIRUB SERPL-MCNC: 0.48 MG/DL (ref 0.2–1)
BUN SERPL-MCNC: 12 MG/DL (ref 5–25)
CALCIUM SERPL-MCNC: 9 MG/DL (ref 8.4–10.2)
CHLORIDE SERPL-SCNC: 103 MMOL/L (ref 96–108)
CO2 SERPL-SCNC: 27 MMOL/L (ref 21–32)
CREAT SERPL-MCNC: 0.57 MG/DL (ref 0.6–1.3)
CRP SERPL QL: 17.3 MG/L
ERYTHROCYTE [DISTWIDTH] IN BLOOD BY AUTOMATED COUNT: 13.2 % (ref 11.6–15.1)
EST. AVERAGE GLUCOSE BLD GHB EST-MCNC: 108 MG/DL
FOLATE SERPL-MCNC: 13.3 NG/ML
GFR SERPL CREATININE-BSD FRML MDRD: 113 ML/MIN/1.73SQ M
GLUCOSE P FAST SERPL-MCNC: 101 MG/DL (ref 65–99)
HBA1C MFR BLD: 5.4 %
HCT VFR BLD AUTO: 39.5 % (ref 34.8–46.1)
HGB BLD-MCNC: 13.2 G/DL (ref 11.5–15.4)
INSULIN SERPL-ACNC: 18.39 UIU/ML (ref 1.9–23)
MCH RBC QN AUTO: 28.4 PG (ref 26.8–34.3)
MCHC RBC AUTO-ENTMCNC: 33.4 G/DL (ref 31.4–37.4)
MCV RBC AUTO: 85 FL (ref 82–98)
PLATELET # BLD AUTO: 274 THOUSANDS/UL (ref 149–390)
PMV BLD AUTO: 10.8 FL (ref 8.9–12.7)
POTASSIUM SERPL-SCNC: 3.9 MMOL/L (ref 3.5–5.3)
PROT SERPL-MCNC: 7.1 G/DL (ref 6.4–8.4)
RBC # BLD AUTO: 4.64 MILLION/UL (ref 3.81–5.12)
SODIUM SERPL-SCNC: 138 MMOL/L (ref 135–147)
TSH SERPL DL<=0.05 MIU/L-ACNC: 2.94 UIU/ML (ref 0.45–4.5)
URATE SERPL-MCNC: 4.6 MG/DL (ref 2–7.5)
VIT B12 SERPL-MCNC: 265 PG/ML (ref 180–914)
WBC # BLD AUTO: 9.84 THOUSAND/UL (ref 4.31–10.16)

## 2024-08-10 PROCEDURE — 83695 ASSAY OF LIPOPROTEIN(A): CPT

## 2024-08-10 PROCEDURE — 84443 ASSAY THYROID STIM HORMONE: CPT

## 2024-08-10 PROCEDURE — 80053 COMPREHEN METABOLIC PANEL: CPT

## 2024-08-10 PROCEDURE — 84550 ASSAY OF BLOOD/URIC ACID: CPT

## 2024-08-10 PROCEDURE — 82746 ASSAY OF FOLIC ACID SERUM: CPT

## 2024-08-10 PROCEDURE — 36415 COLL VENOUS BLD VENIPUNCTURE: CPT

## 2024-08-10 PROCEDURE — 86140 C-REACTIVE PROTEIN: CPT

## 2024-08-10 PROCEDURE — 83704 LIPOPROTEIN BLD QUAN PART: CPT

## 2024-08-10 PROCEDURE — 83036 HEMOGLOBIN GLYCOSYLATED A1C: CPT

## 2024-08-10 PROCEDURE — 85027 COMPLETE CBC AUTOMATED: CPT

## 2024-08-10 PROCEDURE — 83525 ASSAY OF INSULIN: CPT

## 2024-08-10 PROCEDURE — 82607 VITAMIN B-12: CPT

## 2024-08-10 PROCEDURE — 83735 ASSAY OF MAGNESIUM: CPT

## 2024-08-13 LAB
LPA SERPL-SCNC: 9.3 NMOL/L
MAGNESIUM RBC-MCNC: 5.8 MG/DL (ref 3.7–7)

## 2024-08-19 LAB
Lab: NORMAL
Lab: NORMAL
MISCELLANEOUS LAB TEST RESULT: NORMAL

## 2024-08-27 ENCOUNTER — HOSPITAL ENCOUNTER (OUTPATIENT)
Dept: MAMMOGRAPHY | Facility: CLINIC | Age: 44
Discharge: HOME | End: 2024-08-27
Payer: COMMERCIAL

## 2024-08-27 VITALS — BODY MASS INDEX: 47.09 KG/M2 | HEIGHT: 66 IN | WEIGHT: 293 LBS

## 2024-08-27 DIAGNOSIS — Z12.31 ENCOUNTER FOR SCREENING MAMMOGRAM FOR MALIGNANT NEOPLASM OF BREAST: ICD-10-CM

## 2024-08-27 PROCEDURE — 77063 BREAST TOMOSYNTHESIS BI: CPT

## 2024-08-27 PROCEDURE — 77067 SCR MAMMO BI INCL CAD: CPT

## 2024-09-23 ENCOUNTER — TELEPHONE (OUTPATIENT)
Dept: CARDIOLOGY CLINIC | Facility: CLINIC | Age: 44
End: 2024-09-23

## 2024-09-23 NOTE — TELEPHONE ENCOUNTER
Spoke w pt on phone to relay ZIO results from David MARMOLEJO PA-C. Pt wanted to reschedule next year since recent testing came back normal. Booked appt for August of next year with Dr. Funes to establish care with him.

## 2025-07-12 ENCOUNTER — APPOINTMENT (OUTPATIENT)
Dept: LAB | Facility: HOSPITAL | Age: 45
End: 2025-07-12
Payer: COMMERCIAL

## 2025-07-12 DIAGNOSIS — Z00.00 ROUTINE GENERAL MEDICAL EXAMINATION AT A HEALTH CARE FACILITY: ICD-10-CM

## 2025-07-12 LAB
ALBUMIN SERPL BCG-MCNC: 4 G/DL (ref 3.5–5)
ALP SERPL-CCNC: 57 U/L (ref 34–104)
ALT SERPL W P-5'-P-CCNC: 12 U/L (ref 7–52)
ANION GAP SERPL CALCULATED.3IONS-SCNC: 7 MMOL/L (ref 4–13)
AST SERPL W P-5'-P-CCNC: 15 U/L (ref 13–39)
BASOPHILS # BLD AUTO: 0.06 THOUSANDS/ÂΜL (ref 0–0.1)
BASOPHILS NFR BLD AUTO: 1 % (ref 0–1)
BILIRUB SERPL-MCNC: 0.4 MG/DL (ref 0.2–1)
BUN SERPL-MCNC: 12 MG/DL (ref 5–25)
CALCIUM SERPL-MCNC: 9.1 MG/DL (ref 8.4–10.2)
CHLORIDE SERPL-SCNC: 102 MMOL/L (ref 96–108)
CHOLEST SERPL-MCNC: 203 MG/DL (ref ?–200)
CO2 SERPL-SCNC: 28 MMOL/L (ref 21–32)
CREAT SERPL-MCNC: 0.6 MG/DL (ref 0.6–1.3)
EOSINOPHIL # BLD AUTO: 0.16 THOUSAND/ÂΜL (ref 0–0.61)
EOSINOPHIL NFR BLD AUTO: 2 % (ref 0–6)
ERYTHROCYTE [DISTWIDTH] IN BLOOD BY AUTOMATED COUNT: 14 % (ref 11.6–15.1)
EST. AVERAGE GLUCOSE BLD GHB EST-MCNC: 111 MG/DL
GFR SERPL CREATININE-BSD FRML MDRD: 111 ML/MIN/1.73SQ M
GLUCOSE P FAST SERPL-MCNC: 96 MG/DL (ref 65–99)
HBA1C MFR BLD: 5.5 %
HCT VFR BLD AUTO: 41.1 % (ref 34.8–46.1)
HDLC SERPL-MCNC: 43 MG/DL
HGB BLD-MCNC: 13.3 G/DL (ref 11.5–15.4)
IMM GRANULOCYTES # BLD AUTO: 0.02 THOUSAND/UL (ref 0–0.2)
IMM GRANULOCYTES NFR BLD AUTO: 0 % (ref 0–2)
LDLC SERPL CALC-MCNC: 129 MG/DL (ref 0–100)
LYMPHOCYTES # BLD AUTO: 2.39 THOUSANDS/ÂΜL (ref 0.6–4.47)
LYMPHOCYTES NFR BLD AUTO: 27 % (ref 14–44)
MCH RBC QN AUTO: 27.5 PG (ref 26.8–34.3)
MCHC RBC AUTO-ENTMCNC: 32.4 G/DL (ref 31.4–37.4)
MCV RBC AUTO: 85 FL (ref 82–98)
MONOCYTES # BLD AUTO: 0.49 THOUSAND/ÂΜL (ref 0.17–1.22)
MONOCYTES NFR BLD AUTO: 6 % (ref 4–12)
NEUTROPHILS # BLD AUTO: 5.83 THOUSANDS/ÂΜL (ref 1.85–7.62)
NEUTS SEG NFR BLD AUTO: 64 % (ref 43–75)
NONHDLC SERPL-MCNC: 160 MG/DL
NRBC BLD AUTO-RTO: 0 /100 WBCS
PLATELET # BLD AUTO: 274 THOUSANDS/UL (ref 149–390)
PMV BLD AUTO: 11.4 FL (ref 8.9–12.7)
POTASSIUM SERPL-SCNC: 3.8 MMOL/L (ref 3.5–5.3)
PROT SERPL-MCNC: 7.1 G/DL (ref 6.4–8.4)
RBC # BLD AUTO: 4.83 MILLION/UL (ref 3.81–5.12)
SODIUM SERPL-SCNC: 137 MMOL/L (ref 135–147)
TRIGL SERPL-MCNC: 155 MG/DL (ref ?–150)
TSH SERPL DL<=0.05 MIU/L-ACNC: 3.37 UIU/ML (ref 0.45–4.5)
WBC # BLD AUTO: 8.95 THOUSAND/UL (ref 4.31–10.16)

## 2025-07-12 PROCEDURE — 36415 COLL VENOUS BLD VENIPUNCTURE: CPT

## 2025-07-12 PROCEDURE — 80053 COMPREHEN METABOLIC PANEL: CPT

## 2025-07-12 PROCEDURE — 85025 COMPLETE CBC W/AUTO DIFF WBC: CPT

## 2025-07-12 PROCEDURE — 83036 HEMOGLOBIN GLYCOSYLATED A1C: CPT

## 2025-07-12 PROCEDURE — 80061 LIPID PANEL: CPT

## 2025-07-12 PROCEDURE — 84443 ASSAY THYROID STIM HORMONE: CPT

## 2025-08-04 ENCOUNTER — OFFICE VISIT (OUTPATIENT)
Dept: CARDIOLOGY CLINIC | Facility: CLINIC | Age: 45
End: 2025-08-04
Payer: COMMERCIAL

## 2025-08-04 VITALS
HEART RATE: 69 BPM | WEIGHT: 293 LBS | BODY MASS INDEX: 47.09 KG/M2 | SYSTOLIC BLOOD PRESSURE: 136 MMHG | DIASTOLIC BLOOD PRESSURE: 90 MMHG | HEIGHT: 66 IN

## 2025-08-04 DIAGNOSIS — E78.01 FAMILIAL HYPERCHOLESTEREMIA: ICD-10-CM

## 2025-08-04 DIAGNOSIS — R00.2 PALPITATIONS: Primary | ICD-10-CM

## 2025-08-04 DIAGNOSIS — E78.00 HYPERCHOLESTEROLEMIA: ICD-10-CM

## 2025-08-04 PROCEDURE — 99214 OFFICE O/P EST MOD 30 MIN: CPT | Performed by: INTERNAL MEDICINE

## 2025-08-04 PROCEDURE — 93000 ELECTROCARDIOGRAM COMPLETE: CPT | Performed by: INTERNAL MEDICINE

## 2025-08-04 RX ORDER — METOPROLOL TARTRATE 25 MG/1
25 TABLET, FILM COATED ORAL DAILY PRN
Qty: 90 TABLET | Refills: 3 | Status: SHIPPED | OUTPATIENT
Start: 2025-08-04

## 2025-08-16 ENCOUNTER — APPOINTMENT (OUTPATIENT)
Dept: LAB | Facility: CLINIC | Age: 45
End: 2025-08-16

## 2025-08-16 DIAGNOSIS — Z00.6 ENCOUNTER FOR EXAMINATION FOR NORMAL COMPARISON OR CONTROL IN CLINICAL RESEARCH PROGRAM: ICD-10-CM

## 2025-08-16 DIAGNOSIS — E78.00 HYPERCHOLESTEROLEMIA: ICD-10-CM

## 2025-08-16 PROCEDURE — 36415 COLL VENOUS BLD VENIPUNCTURE: CPT
